# Patient Record
(demographics unavailable — no encounter records)

---

## 2025-02-12 NOTE — ASSESSMENT
[FreeTextEntry1] :  .   CPE Labs  Cigarette smoker 1PPD x30 years previously quit for 2 years cold turkey. Patches irritate his skin. Would be amenable to medication in the future but currently in precontemplative phase.  Asymptomatic STD testing lives with yessenia  Takes finasteride + possible minoxidil from [facility], requesting us to send, will find exact names and dosage of medications and send at that call.     Colon Cancer Screening- Had cologuard 2024 WNL Lung Cancer screening- will need at age 50  Nocturia 3-4x/night + weak stream x several months Enlarged prostate on exam  UA and PSA for requisition on saturday morning with other labs Await labs likely start flomax   Flu vaccine declined

## 2025-02-12 NOTE — HEALTH RISK ASSESSMENT
[Good] : ~his/her~  mood as  good [Yes] : Yes [2 - 4 times a month (2 pts)] : 2-4 times a month (2 points) [1 or 2 (0 pts)] : 1 or 2 (0 points) [Never (0 pts)] : Never (0 points) [0] : 2) Feeling down, depressed, or hopeless: Not at all (0) [PHQ-2 Negative - No further assessment needed] : PHQ-2 Negative - No further assessment needed [I have developed a follow-up plan documented below in the note.] : I have developed a follow-up plan documented below in the note. [Current] : Current [20 or more] : 20 or more [HIV Test offered] : HIV Test offered [With Significant Other] : lives with significant other [Employed] : employed [Significant Other] : lives with significant other [Fully functional (bathing, dressing, toileting, transferring, walking, feeding)] : Fully functional (bathing, dressing, toileting, transferring, walking, feeding) [Fully functional (using the telephone, shopping, preparing meals, housekeeping, doing laundry, using] : Fully functional and needs no help or supervision to perform IADLs (using the telephone, shopping, preparing meals, housekeeping, doing laundry, using transportation, managing medications and managing finances) [de-identified] : 1PPD for 30 years  [Reports changes in hearing] : Reports no changes in hearing [Reports changes in vision] : Reports no changes in vision [de-identified] : handmichaeln  [FreeTextEntry3] : carrie

## 2025-02-12 NOTE — HISTORY OF PRESENT ILLNESS
[FreeTextEntry1] : CPE [de-identified] : 47M here for CPE   Complains of nocturia and weak stream at night for the last few months. No family hx of prostate CA.

## 2025-05-30 NOTE — PHYSICAL EXAM
Goal Outcome Evaluation:  Plan of Care Reviewed With: patient        Progress: no change  Neuro: PT A/Ox4. Ambulation x1 d/t ongoing GI  issues.  Resp: 3-4L NC. Lung sounds are clear to coarse. Acceptable MT drainage. IS 1000. Productive cough.   Cardiac: NSR. Levo gtt weaned off. LR gtt per Intensivist d/t decreasing UOP. -120s.  GI: Constant GI discomfort and belching. KUB taken.  : Decreased UOP. Following CRT.   Temp: 99.4  Art line DCd.         [Normal] : no joint swelling and grossly normal strength and tone [de-identified] : enlarged prostate [de-identified] :  dorsal penis just inferior to glans healing rounded lesion, contact dermatitis on extremities

## 2025-05-30 NOTE — ASSESSMENT
[FreeTextEntry1] :  Tick bite on dorsal penis just inferior to glans, healing rounded lesion. Tick was attached for >48 hours patient reports fatigue unsure if worsened. Tick panel.  Asthma renew ventolin Contact dermatitis weed wacking in backyard prednisone taper Previously evaluated Nocturia improved on its own, occasionally wakes up but not as often as previously reported. Large prostate on KISHA negative urine studies, if sx worsen would do trial of flomax.

## 2025-05-30 NOTE — HISTORY OF PRESENT ILLNESS
[FreeTextEntry8] : 47M h/o asthma (well controlled) here for tick bite 3/27/25 underneath the glans, his wife noticed it in the shower 2 days later. Was removed by his wife with her nails. Since then slowly healing lesion at the bite site. No fevers or chills but endorses fatigue.